# Patient Record
Sex: FEMALE | Race: WHITE
[De-identification: names, ages, dates, MRNs, and addresses within clinical notes are randomized per-mention and may not be internally consistent; named-entity substitution may affect disease eponyms.]

---

## 2018-05-08 ENCOUNTER — HOSPITAL ENCOUNTER (OUTPATIENT)
Age: 23
End: 2018-05-08
Payer: COMMERCIAL

## 2018-05-08 DIAGNOSIS — Z12.4: Primary | ICD-10-CM

## 2018-05-08 PROCEDURE — 88175 CYTOPATH C/V AUTO FLUID REDO: CPT

## 2018-05-08 PROCEDURE — G0145 SCR C/V CYTO,THINLAYER,RESCR: HCPCS

## 2019-06-25 ENCOUNTER — HOSPITAL ENCOUNTER (OUTPATIENT)
Age: 24
End: 2019-06-25
Payer: COMMERCIAL

## 2019-06-25 VITALS — BODY MASS INDEX: 32.8 KG/M2

## 2019-06-25 DIAGNOSIS — Z11.3: Primary | ICD-10-CM

## 2019-06-25 LAB — SAMPLE ADEQUACY CONTROL: (no result)

## 2019-06-25 PROCEDURE — 87491 CHLMYD TRACH DNA AMP PROBE: CPT

## 2019-06-25 PROCEDURE — 87591 N.GONORRHOEAE DNA AMP PROB: CPT

## 2021-12-22 ENCOUNTER — HOSPITAL ENCOUNTER (OUTPATIENT)
Age: 26
End: 2021-12-22
Payer: COMMERCIAL

## 2021-12-22 DIAGNOSIS — Z12.4: Primary | ICD-10-CM

## 2021-12-22 PROCEDURE — G0145 SCR C/V CYTO,THINLAYER,RESCR: HCPCS

## 2021-12-22 PROCEDURE — 88175 CYTOPATH C/V AUTO FLUID REDO: CPT

## 2023-02-01 ENCOUNTER — HOSPITAL ENCOUNTER (OUTPATIENT)
Age: 28
Discharge: HOME | End: 2023-02-01
Payer: COMMERCIAL

## 2023-02-01 DIAGNOSIS — Z34.90: Primary | ICD-10-CM

## 2023-02-01 PROCEDURE — 87591 N.GONORRHOEAE DNA AMP PROB: CPT

## 2023-02-01 PROCEDURE — 87491 CHLMYD TRACH DNA AMP PROBE: CPT

## 2023-02-01 PROCEDURE — 87086 URINE CULTURE/COLONY COUNT: CPT

## 2023-03-02 ENCOUNTER — HOSPITAL ENCOUNTER (OUTPATIENT)
Age: 28
Discharge: HOME | End: 2023-03-02
Payer: COMMERCIAL

## 2023-03-02 DIAGNOSIS — O99.210: Primary | ICD-10-CM

## 2023-03-02 LAB
DEPRECATED RDW RBC: 38.9 FL (ref 35.1–43.9)
ERYTHROCYTE [DISTWIDTH] IN BLOOD: 13.1 % (ref 11.6–14.6)
GLUCOSE 1H P 50 G GLC PO SERPL-MCNC: 104 MG/DL (ref 70–140)
HCT VFR BLD AUTO: 37.2 % (ref 37–47)
HEMOGLOBIN: 12.2 G/DL (ref 12–15)
HGB BLD-MCNC: 12.2 G/DL (ref 12–15)
IMMATURE GRANULOCYTES COUNT: 0.14 X10^3/UL (ref 0–0)
MCV RBC: 82.1 FL (ref 81–99)
MEAN CORP HGB CONC: 32.8 G/DL (ref 32–36)
MEAN PLATELET VOL.: 10.2 FL (ref 6.2–12)
NRBC FLAGGED BY ANALYZER: 0 % (ref 0–5)
PLATELET # BLD: 287 K/MM3 (ref 150–450)
PLATELET COUNT: 287 K/MM3 (ref 150–450)
RBC # BLD AUTO: 4.53 M/MM3 (ref 4.2–5.4)
RBC DISTRIBUTION WIDTH CV: 13.1 % (ref 11.6–14.6)
RBC DISTRIBUTION WIDTH SD: 38.9 FL (ref 35.1–43.9)
WBC # BLD AUTO: 9.4 K/MM3 (ref 4.4–11)
WHITE BLOOD COUNT: 9.4 K/MM3 (ref 4.4–11)

## 2023-03-02 PROCEDURE — 86803 HEPATITIS C AB TEST: CPT

## 2023-03-02 PROCEDURE — 85025 COMPLETE CBC W/AUTO DIFF WBC: CPT

## 2023-03-02 PROCEDURE — 86780 TREPONEMA PALLIDUM: CPT

## 2023-03-02 PROCEDURE — 36415 COLL VENOUS BLD VENIPUNCTURE: CPT

## 2023-03-02 PROCEDURE — 86850 RBC ANTIBODY SCREEN: CPT

## 2023-03-02 PROCEDURE — 86900 BLOOD TYPING SEROLOGIC ABO: CPT

## 2023-03-02 PROCEDURE — 82950 GLUCOSE TEST: CPT

## 2023-03-02 PROCEDURE — 86762 RUBELLA ANTIBODY: CPT

## 2023-03-02 PROCEDURE — 86703 HIV-1/HIV-2 1 RESULT ANTBDY: CPT

## 2023-03-02 PROCEDURE — 87340 HEPATITIS B SURFACE AG IA: CPT

## 2023-03-02 PROCEDURE — 86901 BLOOD TYPING SEROLOGIC RH(D): CPT

## 2023-04-06 ENCOUNTER — HOSPITAL ENCOUNTER (OUTPATIENT)
Age: 28
Discharge: HOME | End: 2023-04-06
Payer: COMMERCIAL

## 2023-04-06 DIAGNOSIS — Z34.92: Primary | ICD-10-CM

## 2023-04-06 PROCEDURE — 76805 OB US >/= 14 WKS SNGL FETUS: CPT

## 2023-04-06 PROCEDURE — 76817 TRANSVAGINAL US OBSTETRIC: CPT

## 2023-06-08 ENCOUNTER — HOSPITAL ENCOUNTER (OUTPATIENT)
Dept: HOSPITAL 100 - LAB | Age: 28
Discharge: HOME | End: 2023-06-08
Payer: COMMERCIAL

## 2023-06-08 DIAGNOSIS — Z3A.00: ICD-10-CM

## 2023-06-08 DIAGNOSIS — O09.90: Primary | ICD-10-CM

## 2023-06-08 LAB
DEPRECATED RDW RBC: 44 FL (ref 35.1–43.9)
ERYTHROCYTE [DISTWIDTH] IN BLOOD: 13.9 % (ref 11.6–14.6)
GLUCOSE 1H P 50 G GLC PO SERPL-MCNC: 116 MG/DL (ref 70–140)
HCT VFR BLD AUTO: 37.7 % (ref 37–47)
HEMOGLOBIN: 11.8 G/DL (ref 12–15)
HGB BLD-MCNC: 11.8 G/DL (ref 12–15)
IMMATURE GRANULOCYTES COUNT: 0.09 X10^3/UL (ref 0–0)
MCV RBC: 87.7 FL (ref 81–99)
MEAN CORP HGB CONC: 31.3 G/DL (ref 32–36)
MEAN PLATELET VOL.: 10.4 FL (ref 6.2–12)
NRBC FLAGGED BY ANALYZER: 0 % (ref 0–5)
PLATELET # BLD: 303 K/MM3 (ref 150–450)
PLATELET COUNT: 303 K/MM3 (ref 150–450)
RBC # BLD AUTO: 4.3 M/MM3 (ref 4.2–5.4)
RBC DISTRIBUTION WIDTH CV: 13.9 % (ref 11.6–14.6)
RBC DISTRIBUTION WIDTH SD: 44 FL (ref 35.1–43.9)
WBC # BLD AUTO: 10.2 K/MM3 (ref 4.4–11)
WHITE BLOOD COUNT: 10.2 K/MM3 (ref 4.4–11)

## 2023-06-08 PROCEDURE — 85025 COMPLETE CBC W/AUTO DIFF WBC: CPT

## 2023-06-08 PROCEDURE — 82950 GLUCOSE TEST: CPT

## 2023-06-08 PROCEDURE — 36415 COLL VENOUS BLD VENIPUNCTURE: CPT

## 2023-06-08 PROCEDURE — 86780 TREPONEMA PALLIDUM: CPT

## 2023-06-08 PROCEDURE — 86901 BLOOD TYPING SEROLOGIC RH(D): CPT

## 2023-06-08 PROCEDURE — 86900 BLOOD TYPING SEROLOGIC ABO: CPT

## 2023-06-08 PROCEDURE — 86703 HIV-1/HIV-2 1 RESULT ANTBDY: CPT

## 2023-08-04 ENCOUNTER — HOSPITAL ENCOUNTER (OUTPATIENT)
Dept: HOSPITAL 100 - LABSPEC | Age: 28
Discharge: HOME | End: 2023-08-04
Payer: COMMERCIAL

## 2023-08-04 DIAGNOSIS — Z3A.00: ICD-10-CM

## 2023-08-04 DIAGNOSIS — O09.90: Primary | ICD-10-CM

## 2023-08-04 PROCEDURE — 87081 CULTURE SCREEN ONLY: CPT

## 2023-08-31 ENCOUNTER — HOSPITAL ENCOUNTER (INPATIENT)
Age: 28
LOS: 2 days | Discharge: HOME | End: 2023-09-02
Payer: COMMERCIAL

## 2023-08-31 VITALS — DIASTOLIC BLOOD PRESSURE: 59 MMHG | SYSTOLIC BLOOD PRESSURE: 115 MMHG

## 2023-08-31 VITALS — OXYGEN SATURATION: 100 % | HEART RATE: 139 BPM

## 2023-08-31 VITALS — HEART RATE: 116 BPM | OXYGEN SATURATION: 95 %

## 2023-08-31 VITALS — TEMPERATURE: 97.9 F | DIASTOLIC BLOOD PRESSURE: 64 MMHG | HEART RATE: 108 BPM | SYSTOLIC BLOOD PRESSURE: 136 MMHG

## 2023-08-31 VITALS — OXYGEN SATURATION: 100 % | TEMPERATURE: 97.2 F | HEART RATE: 95 BPM

## 2023-08-31 VITALS — HEART RATE: 81 BPM | SYSTOLIC BLOOD PRESSURE: 139 MMHG | DIASTOLIC BLOOD PRESSURE: 78 MMHG

## 2023-08-31 VITALS — OXYGEN SATURATION: 99 % | HEART RATE: 90 BPM

## 2023-08-31 VITALS — TEMPERATURE: 97.52 F | HEART RATE: 101 BPM | OXYGEN SATURATION: 98 %

## 2023-08-31 VITALS
HEART RATE: 112 BPM | DIASTOLIC BLOOD PRESSURE: 63 MMHG | TEMPERATURE: 97.2 F | SYSTOLIC BLOOD PRESSURE: 128 MMHG | OXYGEN SATURATION: 98 %

## 2023-08-31 VITALS — OXYGEN SATURATION: 100 % | SYSTOLIC BLOOD PRESSURE: 115 MMHG | DIASTOLIC BLOOD PRESSURE: 53 MMHG | HEART RATE: 97 BPM

## 2023-08-31 VITALS — HEART RATE: 108 BPM | DIASTOLIC BLOOD PRESSURE: 76 MMHG | SYSTOLIC BLOOD PRESSURE: 129 MMHG

## 2023-08-31 VITALS — HEART RATE: 143 BPM | OXYGEN SATURATION: 100 %

## 2023-08-31 VITALS — HEART RATE: 100 BPM | SYSTOLIC BLOOD PRESSURE: 137 MMHG | DIASTOLIC BLOOD PRESSURE: 67 MMHG

## 2023-08-31 VITALS — OXYGEN SATURATION: 99 % | HEART RATE: 100 BPM

## 2023-08-31 VITALS — TEMPERATURE: 97.52 F

## 2023-08-31 VITALS — OXYGEN SATURATION: 99 % | HEART RATE: 132 BPM

## 2023-08-31 VITALS
DIASTOLIC BLOOD PRESSURE: 73 MMHG | OXYGEN SATURATION: 97 % | HEART RATE: 99 BPM | SYSTOLIC BLOOD PRESSURE: 132 MMHG | TEMPERATURE: 97.52 F

## 2023-08-31 VITALS — DIASTOLIC BLOOD PRESSURE: 83 MMHG | TEMPERATURE: 97.6 F | HEART RATE: 94 BPM | SYSTOLIC BLOOD PRESSURE: 121 MMHG

## 2023-08-31 VITALS — BODY MASS INDEX: 42.9 KG/M2

## 2023-08-31 VITALS — OXYGEN SATURATION: 86 % | HEART RATE: 117 BPM

## 2023-08-31 VITALS — HEART RATE: 90 BPM | SYSTOLIC BLOOD PRESSURE: 132 MMHG | DIASTOLIC BLOOD PRESSURE: 87 MMHG

## 2023-08-31 VITALS — HEART RATE: 98 BPM | OXYGEN SATURATION: 98 %

## 2023-08-31 VITALS — SYSTOLIC BLOOD PRESSURE: 112 MMHG | DIASTOLIC BLOOD PRESSURE: 76 MMHG | HEART RATE: 88 BPM

## 2023-08-31 VITALS — OXYGEN SATURATION: 99 % | HEART RATE: 115 BPM

## 2023-08-31 VITALS — HEART RATE: 102 BPM | SYSTOLIC BLOOD PRESSURE: 97 MMHG | DIASTOLIC BLOOD PRESSURE: 55 MMHG | OXYGEN SATURATION: 100 %

## 2023-08-31 VITALS — HEART RATE: 117 BPM | OXYGEN SATURATION: 100 %

## 2023-08-31 VITALS — SYSTOLIC BLOOD PRESSURE: 104 MMHG | DIASTOLIC BLOOD PRESSURE: 56 MMHG

## 2023-08-31 VITALS — OXYGEN SATURATION: 89 % | HEART RATE: 126 BPM

## 2023-08-31 VITALS — TEMPERATURE: 97.52 F | OXYGEN SATURATION: 99 %

## 2023-08-31 VITALS
SYSTOLIC BLOOD PRESSURE: 125 MMHG | TEMPERATURE: 97.7 F | HEART RATE: 89 BPM | DIASTOLIC BLOOD PRESSURE: 58 MMHG | OXYGEN SATURATION: 94 %

## 2023-08-31 VITALS — TEMPERATURE: 97.6 F | OXYGEN SATURATION: 98 %

## 2023-08-31 VITALS — HEART RATE: 103 BPM | OXYGEN SATURATION: 98 %

## 2023-08-31 VITALS — HEART RATE: 102 BPM | OXYGEN SATURATION: 99 %

## 2023-08-31 VITALS — SYSTOLIC BLOOD PRESSURE: 127 MMHG | OXYGEN SATURATION: 94 % | DIASTOLIC BLOOD PRESSURE: 58 MMHG | HEART RATE: 88 BPM

## 2023-08-31 VITALS — HEART RATE: 104 BPM | OXYGEN SATURATION: 98 %

## 2023-08-31 VITALS — HEART RATE: 100 BPM | OXYGEN SATURATION: 98 %

## 2023-08-31 VITALS — DIASTOLIC BLOOD PRESSURE: 59 MMHG | SYSTOLIC BLOOD PRESSURE: 113 MMHG | HEART RATE: 112 BPM

## 2023-08-31 VITALS — HEART RATE: 130 BPM | OXYGEN SATURATION: 100 %

## 2023-08-31 VITALS — DIASTOLIC BLOOD PRESSURE: 67 MMHG | SYSTOLIC BLOOD PRESSURE: 141 MMHG | HEART RATE: 101 BPM

## 2023-08-31 VITALS — DIASTOLIC BLOOD PRESSURE: 57 MMHG | SYSTOLIC BLOOD PRESSURE: 105 MMHG

## 2023-08-31 VITALS — OXYGEN SATURATION: 100 % | HEART RATE: 113 BPM

## 2023-08-31 VITALS — HEART RATE: 140 BPM | OXYGEN SATURATION: 99 %

## 2023-08-31 VITALS — HEART RATE: 121 BPM | OXYGEN SATURATION: 99 %

## 2023-08-31 VITALS — OXYGEN SATURATION: 99 % | HEART RATE: 98 BPM

## 2023-08-31 VITALS — OXYGEN SATURATION: 100 % | HEART RATE: 104 BPM

## 2023-08-31 VITALS — OXYGEN SATURATION: 98 % | HEART RATE: 96 BPM

## 2023-08-31 VITALS — DIASTOLIC BLOOD PRESSURE: 59 MMHG | SYSTOLIC BLOOD PRESSURE: 112 MMHG

## 2023-08-31 VITALS — OXYGEN SATURATION: 98 % | HEART RATE: 86 BPM

## 2023-08-31 VITALS — OXYGEN SATURATION: 100 % | HEART RATE: 93 BPM

## 2023-08-31 VITALS — OXYGEN SATURATION: 100 % | HEART RATE: 94 BPM

## 2023-08-31 VITALS — TEMPERATURE: 96.98 F

## 2023-08-31 VITALS — TEMPERATURE: 97.34 F

## 2023-08-31 VITALS — OXYGEN SATURATION: 100 % | HEART RATE: 100 BPM

## 2023-08-31 VITALS — SYSTOLIC BLOOD PRESSURE: 100 MMHG | DIASTOLIC BLOOD PRESSURE: 52 MMHG

## 2023-08-31 VITALS — OXYGEN SATURATION: 93 % | HEART RATE: 98 BPM

## 2023-08-31 VITALS — HEART RATE: 48 BPM | OXYGEN SATURATION: 80 %

## 2023-08-31 VITALS — OXYGEN SATURATION: 99 % | HEART RATE: 99 BPM

## 2023-08-31 VITALS — OXYGEN SATURATION: 100 %

## 2023-08-31 VITALS — HEART RATE: 103 BPM | OXYGEN SATURATION: 100 %

## 2023-08-31 VITALS — HEART RATE: 131 BPM | OXYGEN SATURATION: 91 %

## 2023-08-31 DIAGNOSIS — O26.893: ICD-10-CM

## 2023-08-31 DIAGNOSIS — Z3A.39: ICD-10-CM

## 2023-08-31 DIAGNOSIS — Z67.11: ICD-10-CM

## 2023-08-31 LAB
ALANINE AMINOTRANSFER ALT/SGPT: 16 U/L (ref 13–56)
AST(SGOT): 20 U/L (ref 15–37)
CREAT UR-MCNC: 291 MG/DL
DEPRECATED RDW RBC: 42.8 FL (ref 35.1–43.9)
ERYTHROCYTE [DISTWIDTH] IN BLOOD: 14.1 % (ref 11.6–14.6)
EST GLOM FILT RATE - AFR AMER: 160 ML/MIN (ref 60–?)
ESTIMATED CREATININE CLEARANCE: 129.94 ML/MIN
HCT VFR BLD AUTO: 39.4 % (ref 37–47)
HEMOGLOBIN: 12.8 G/DL (ref 12–15)
HGB BLD-MCNC: 12.8 G/DL (ref 12–15)
IMMATURE GRANULOCYTES COUNT: 0.08 X10^3/UL (ref 0–0)
MCV RBC: 83.3 FL (ref 81–99)
MEAN CORP HGB CONC: 32.5 G/DL (ref 32–36)
MEAN PLATELET VOL.: 11.7 FL (ref 6.2–12)
NRBC FLAGGED BY ANALYZER: 0 % (ref 0–5)
PLATELET # BLD: 244 K/MM3 (ref 150–450)
PLATELET COUNT: 244 K/MM3 (ref 150–450)
PROT UR-MCNC: 64.5 MG/DL (ref ?–11.9)
PROT/CREAT UR: 222 MG/G CRE (ref 0–200)
RBC # BLD AUTO: 4.73 M/MM3 (ref 4.2–5.4)
RBC DISTRIBUTION WIDTH CV: 14.1 % (ref 11.6–14.6)
RBC DISTRIBUTION WIDTH SD: 42.8 FL (ref 35.1–43.9)
URATE SERPL-MCNC: 6.1 MG/DL (ref 2.6–6)
WBC # BLD AUTO: 12.5 K/MM3 (ref 4.4–11)
WHITE BLOOD COUNT: 12.5 K/MM3 (ref 4.4–11)

## 2023-08-31 PROCEDURE — 82570 ASSAY OF URINE CREATININE: CPT

## 2023-08-31 PROCEDURE — 59025 FETAL NON-STRESS TEST: CPT

## 2023-08-31 PROCEDURE — 86901 BLOOD TYPING SEROLOGIC RH(D): CPT

## 2023-08-31 PROCEDURE — 76815 OB US LIMITED FETUS(S): CPT

## 2023-08-31 PROCEDURE — 86780 TREPONEMA PALLIDUM: CPT

## 2023-08-31 PROCEDURE — 99221 1ST HOSP IP/OBS SF/LOW 40: CPT

## 2023-08-31 PROCEDURE — 85461 HEMOGLOBIN FETAL: CPT

## 2023-08-31 PROCEDURE — 86850 RBC ANTIBODY SCREEN: CPT

## 2023-08-31 PROCEDURE — 85025 COMPLETE CBC W/AUTO DIFF WBC: CPT

## 2023-08-31 PROCEDURE — 82565 ASSAY OF CREATININE: CPT

## 2023-08-31 PROCEDURE — 84450 TRANSFERASE (AST) (SGOT): CPT

## 2023-08-31 PROCEDURE — 84550 ASSAY OF BLOOD/URIC ACID: CPT

## 2023-08-31 PROCEDURE — 86900 BLOOD TYPING SEROLOGIC ABO: CPT

## 2023-08-31 PROCEDURE — G0378 HOSPITAL OBSERVATION PER HR: HCPCS

## 2023-08-31 PROCEDURE — 84156 ASSAY OF PROTEIN URINE: CPT

## 2023-08-31 PROCEDURE — 84460 ALANINE AMINO (ALT) (SGPT): CPT

## 2023-08-31 PROCEDURE — A4216 STERILE WATER/SALINE, 10 ML: HCPCS

## 2023-08-31 PROCEDURE — 59050 FETAL MONITOR W/REPORT: CPT

## 2023-09-01 VITALS
HEART RATE: 76 BPM | OXYGEN SATURATION: 97 % | RESPIRATION RATE: 16 BRPM | DIASTOLIC BLOOD PRESSURE: 67 MMHG | SYSTOLIC BLOOD PRESSURE: 108 MMHG | TEMPERATURE: 97.52 F

## 2023-09-01 VITALS
HEART RATE: 90 BPM | DIASTOLIC BLOOD PRESSURE: 47 MMHG | OXYGEN SATURATION: 97 % | SYSTOLIC BLOOD PRESSURE: 96 MMHG | TEMPERATURE: 98.1 F | RESPIRATION RATE: 16 BRPM

## 2023-09-01 VITALS
TEMPERATURE: 97.52 F | SYSTOLIC BLOOD PRESSURE: 119 MMHG | RESPIRATION RATE: 16 BRPM | DIASTOLIC BLOOD PRESSURE: 57 MMHG | HEART RATE: 96 BPM

## 2023-09-01 VITALS
OXYGEN SATURATION: 97 % | DIASTOLIC BLOOD PRESSURE: 65 MMHG | RESPIRATION RATE: 16 BRPM | TEMPERATURE: 97.9 F | SYSTOLIC BLOOD PRESSURE: 121 MMHG | HEART RATE: 94 BPM

## 2023-09-01 VITALS
RESPIRATION RATE: 14 BRPM | HEART RATE: 75 BPM | DIASTOLIC BLOOD PRESSURE: 60 MMHG | SYSTOLIC BLOOD PRESSURE: 113 MMHG | OXYGEN SATURATION: 97 % | TEMPERATURE: 97.9 F

## 2023-09-02 VITALS
SYSTOLIC BLOOD PRESSURE: 110 MMHG | RESPIRATION RATE: 16 BRPM | DIASTOLIC BLOOD PRESSURE: 59 MMHG | TEMPERATURE: 97.8 F | HEART RATE: 83 BPM

## 2023-09-02 VITALS
SYSTOLIC BLOOD PRESSURE: 111 MMHG | DIASTOLIC BLOOD PRESSURE: 44 MMHG | OXYGEN SATURATION: 97 % | HEART RATE: 78 BPM | TEMPERATURE: 96.98 F | RESPIRATION RATE: 16 BRPM

## 2024-06-18 ENCOUNTER — APPOINTMENT (OUTPATIENT)
Dept: URBAN - METROPOLITAN AREA CLINIC 204 | Age: 29
Setting detail: DERMATOLOGY
End: 2024-06-19

## 2024-06-18 PROCEDURE — 99213 OFFICE O/P EST LOW 20 MIN: CPT

## 2024-06-18 PROCEDURE — OTHER MIPS QUALITY: OTHER

## 2024-10-25 ENCOUNTER — HOSPITAL ENCOUNTER (OUTPATIENT)
Dept: HOSPITAL 100 - LABSPEC | Age: 29
Discharge: HOME | End: 2024-10-25
Payer: COMMERCIAL

## 2024-10-25 DIAGNOSIS — Z12.4: Primary | ICD-10-CM

## 2024-10-25 PROCEDURE — G0145 SCR C/V CYTO,THINLAYER,RESCR: HCPCS

## 2024-10-25 PROCEDURE — 88175 CYTOPATH C/V AUTO FLUID REDO: CPT

## 2024-12-27 ENCOUNTER — HOSPITAL ENCOUNTER (OUTPATIENT)
Dept: HOSPITAL 100 - US | Age: 29
Discharge: HOME | End: 2024-12-27
Payer: COMMERCIAL

## 2024-12-27 ENCOUNTER — HOSPITAL ENCOUNTER (OUTPATIENT)
Dept: HOSPITAL 100 - BWCLAB | Age: 29
Discharge: HOME | End: 2024-12-27
Payer: COMMERCIAL

## 2024-12-27 DIAGNOSIS — Z3A.00: ICD-10-CM

## 2024-12-27 DIAGNOSIS — O36.80X0: Primary | ICD-10-CM

## 2024-12-27 LAB — HCG SERPL QL: 3695 MIU/ML (ref 1–3)

## 2024-12-27 PROCEDURE — 86900 BLOOD TYPING SEROLOGIC ABO: CPT

## 2024-12-27 PROCEDURE — 76817 TRANSVAGINAL US OBSTETRIC: CPT

## 2024-12-27 PROCEDURE — 86850 RBC ANTIBODY SCREEN: CPT

## 2024-12-27 PROCEDURE — 36415 COLL VENOUS BLD VENIPUNCTURE: CPT

## 2024-12-27 PROCEDURE — 86901 BLOOD TYPING SEROLOGIC RH(D): CPT

## 2024-12-27 PROCEDURE — 84702 CHORIONIC GONADOTROPIN TEST: CPT

## 2024-12-27 NOTE — XMS RPT_ITS
Comprehensive CCD (C-CDA v2.1)  
  
                          Created on: 2024  
  
  
THIEN CAMACHO  
External Reference #: CDR,PersonID:[id number]  
: 1995  
Sex: Female  
  
Demographics  
  
  
                                        Address             [address]  
[address]  
[place], [place]  [id number]  
   
                                        Home Phone          [phone]  
   
                                        Home Phone          [phone]  
   
                                        Preferred Language  English  
   
                                        Marital Status        
   
                                        Shinto Affiliation Unknown  
   
                                        Race                White  
   
                                        Ethnic Group        Unknown  
  
  
Author  
  
  
                                        Organization        [facility]  
  
  
Care Team Providers  
  
  
                                Care Team Member Name Role            Phone  
   
                                Jones Matos Unavailable     Unavailable  
   
                                Jones Matos Unavailable     Unavailable  
   
                                Furness, Keith T Unavailable     Unavailable  
  
  
  
Allergies  
  
  
                                                    Allergy   
Classification                          Reported   
Allergen(s)               Allergy Type              Date of   
Onset                     Reaction(s)               Facility  
   
                                                      
(1 source)                              No Known   
Allergies;   
Translations:   
[No Known   
Allergies]                              Propensity to   
adverse   
reactions to   
drug (disorder)                                             [facility]   
Repository  
   
                                                      
(1 source)                              No Known   
Medication   
Allergies;   
Translations:   
[No Known   
Medication   
Allergies]                              Propensity to   
adverse   
reactions to   
drug (disorder)                                             [facility]   
Repository  
  
  
  
Encounters  
  
  
                          Encounter Date Encounter Type Care Provider Facility  
   
                                                    Start: 2017  
End: 2017     Ambulatory          Jones Matos   Facility:[facility]  
ospital  
  
  
  
Payers  
  
  
                          Date         Payer Category Payer        Policy ID  
   
                          2017   [employer] ( and others  
)                
  
  
  
Summary Purpose  
  
  
                                                      
  
  
  
Family History  
No Family History Records Found  
  
Advance Directives  
No Advanced Directives Records Found  
  
Additional Source Comments  
  
  
  
                                                    INFORMATION SOURCE (unrecogn  
ized section and content)  
   
                                          
  
  
  
                                        DATE CREATED        AUTHOR  
   
                                2018                      [facility]  
  
  
FOR RECORDS PERTAINING TO PATIENTS WHO ARE OR HAVE BEEN ENROLLED IN A CHEMICAL 
DEPENDENCY/SUBSTANCEABUSE PROGRAM, SOME INFORMATION MAY BE OMITTED. This 
clinical summary was aggregated from multiple sources. Caution should be 
exercised in using it in the provision of clinical care. This summary normalizes
information from multiple sources, and as a consequence, information in this 
document may materially change the coding, format and clinical context of 
patient data. In addition, data may be omitted in some cases. CLINICAL DECISIONS
SHOULD BE BASED ON THE PRIMARY CLINICAL RECORDS. [employer] provides 
no warranty or guarantee of the accuracy or completeness of information in this 
document.

## 2024-12-27 NOTE — XMS RPT_ITS
Comprehensive CCD (C-CDA v2.1)  
  
                          Created on: 2024  
  
  
THIEN CAMACHO  
External Reference #: CDR,PersonID:[id number]  
: 1995  
Sex: Female  
  
Demographics  
  
  
                                        Address             [address]  
[address]  
[place], [place]  [id number]  
   
                                        Home Phone          [phone]  
   
                                        Home Phone          [phone]  
   
                                        Preferred Language  English  
   
                                        Marital Status        
   
                                        Scientologist Affiliation Unknown  
   
                                        Race                White  
   
                                        Ethnic Group        Unknown  
  
  
Author  
  
  
                                        Organization        [facility]  
  
  
Care Team Providers  
  
  
                                Care Team Member Name Role            Phone  
   
                                Jones Matos Unavailable     Unavailable  
   
                                Jones Matos Unavailable     Unavailable  
   
                                Furness, Keith T Unavailable     Unavailable  
  
  
  
Allergies  
  
  
                                                    Allergy   
Classification                          Reported   
Allergen(s)               Allergy Type              Date of   
Onset                     Reaction(s)               Facility  
   
                                                      
(1 source)                              No Known   
Allergies;   
Translations:   
[No Known   
Allergies]                              Propensity to   
adverse   
reactions to   
drug (disorder)                                             [facility]   
Repository  
   
                                                      
(1 source)                              No Known   
Medication   
Allergies;   
Translations:   
[No Known   
Medication   
Allergies]                              Propensity to   
adverse   
reactions to   
drug (disorder)                                             [facility]   
Repository  
  
  
  
Encounters  
  
  
                          Encounter Date Encounter Type Care Provider Facility  
   
                                                    Start: 2017  
End: 2017     Ambulatory          Jones Matos   Facility:[facility]  
ospital  
  
  
  
Payers  
  
  
                          Date         Payer Category Payer        Policy ID  
   
                          2017   [employer] ( and others  
)                
  
  
  
Summary Purpose  
  
  
                                                      
  
  
  
Family History  
No Family History Records Found  
  
Advance Directives  
No Advanced Directives Records Found  
  
Additional Source Comments  
  
  
  
                                                    INFORMATION SOURCE (unrecogn  
ized section and content)  
   
                                          
  
  
  
                                        DATE CREATED        AUTHOR  
   
                                2018                      [facility]  
  
  
FOR RECORDS PERTAINING TO PATIENTS WHO ARE OR HAVE BEEN ENROLLED IN A CHEMICAL 
DEPENDENCY/SUBSTANCEABUSE PROGRAM, SOME INFORMATION MAY BE OMITTED. This 
clinical summary was aggregated from multiple sources. Caution should be 
exercised in using it in the provision of clinical care. This summary normalizes
information from multiple sources, and as a consequence, information in this 
document may materially change the coding, format and clinical context of 
patient data. In addition, data may be omitted in some cases. CLINICAL DECISIONS
SHOULD BE BASED ON THE PRIMARY CLINICAL RECORDS. [employer] provides 
no warranty or guarantee of the accuracy or completeness of information in this 
document.

## 2024-12-27 NOTE — US_ITS
REPORT-ID:[id number] 
 
INDICATION: fetal well being 
 
EXAMINATION: US OB Transvaginal 
 
TECHNIQUE: Transvaginal (for optimal evaluation of the adnexa) pelvic 
ultrasound was performed. Grayscale, spectral waveform, and color flow 
Doppler evaluation of the adnexa. 
 
COMPARISON: None. 
____________________________________________ 
 
FINDINGS: 
UTERUS: Measures 10.3 cm in length.. 
RIGHT OVARY:: Not visualized. 
LEFT OVARY: Not visualized. 
FREE FLUID:  None. 
 
INTRAUTERINE GESTATIONAL SAC(s) (size/shape): Single. Mean sac diameter of 
1.1 mm. 
 
No adnexal ectopic visualized. 
 
ORDER #: [id number] US/Transvaginal w/Preg US  
IMPRESSION:  
 
  
Intrauterine pregnancy of uncertain viability. There is an intrauterine  
gestational sac with no yolk sac or embryo. Recommend serial beta hCGs and  
follow-up OB ultrasound in 1-2 weeks.  
 
  
Electronically Signed:  
Romeo Gonzales MD  
2024/12/27 at 16:53 EST  
Reading Location ID and State: [id number] / [place]  
Tel [phone], Service support  [phone], Fax [phone]

## 2024-12-29 ENCOUNTER — HOSPITAL ENCOUNTER (OUTPATIENT)
Dept: HOSPITAL 100 - LAB | Age: 29
Discharge: HOME | End: 2024-12-29
Payer: COMMERCIAL

## 2024-12-29 DIAGNOSIS — O20.9: Primary | ICD-10-CM

## 2024-12-29 DIAGNOSIS — Z3A.00: ICD-10-CM

## 2024-12-29 LAB — HCG SERPL QL: 3635 MIU/ML (ref 1–3)

## 2024-12-29 PROCEDURE — 36415 COLL VENOUS BLD VENIPUNCTURE: CPT

## 2024-12-29 PROCEDURE — 84702 CHORIONIC GONADOTROPIN TEST: CPT

## 2024-12-29 NOTE — XMS RPT_ITS
Comprehensive CCD (C-CDA v2.1)  
  
                          Created on: 2024  
  
  
THIEN CAMACHO  
External Reference #: CDR,PersonID:[id number]  
: 1995  
Sex: Female  
  
Demographics  
  
  
                                        Address             [address]  
[address]  
[place], [place]  [id number]  
   
                                        Home Phone          [phone]  
   
                                        Home Phone          [phone]  
   
                                        Preferred Language  English  
   
                                        Marital Status        
   
                                        Mu-ism Affiliation Unknown  
   
                                        Race                White  
   
                                        Ethnic Group        Unknown  
  
  
Author  
  
  
                                        Organization        [facility]  
  
  
Care Team Providers  
  
  
                                Care Team Member Name Role            Phone  
   
                                Jnoes Matos Unavailable     Unavailable  
   
                                Jones Matos Unavailable     Unavailable  
   
                                Furness, Keith T Unavailable     Unavailable  
  
  
  
Allergies  
  
  
                                                    Allergy   
Classification                          Reported   
Allergen(s)               Allergy Type              Date of   
Onset                     Reaction(s)               Facility  
   
                                                      
(1 source)                              No Known   
Allergies;   
Translations:   
[No Known   
Allergies]                              Propensity to   
adverse   
reactions to   
drug (disorder)                                             [facility]   
Repository  
   
                                                      
(1 source)                              No Known   
Medication   
Allergies;   
Translations:   
[No Known   
Medication   
Allergies]                              Propensity to   
adverse   
reactions to   
drug (disorder)                                             [facility]   
Repository  
  
  
  
Encounters  
  
  
                          Encounter Date Encounter Type Care Provider Facility  
   
                                                    Start: 2017  
End: 2017     Ambulatory          Jones Matos   Facility:[facility]  
ospital  
  
  
  
Payers  
  
  
                          Date         Payer Category Payer        Policy ID  
   
                          2017   [employer] ( and others  
)                
  
  
  
Summary Purpose  
  
  
                                                      
  
  
  
Family History  
No Family History Records Found  
  
Advance Directives  
No Advanced Directives Records Found  
  
Additional Source Comments  
  
  
  
                                                    INFORMATION SOURCE (unrecogn  
ized section and content)  
   
                                          
  
  
  
                                        DATE CREATED        AUTHOR  
   
                                2018                      [facility]  
  
  
FOR RECORDS PERTAINING TO PATIENTS WHO ARE OR HAVE BEEN ENROLLED IN A CHEMICAL 
DEPENDENCY/SUBSTANCEABUSE PROGRAM, SOME INFORMATION MAY BE OMITTED. This 
clinical summary was aggregated from multiple sources. Caution should be 
exercised in using it in the provision of clinical care. This summary normalizes
information from multiple sources, and as a consequence, information in this 
document may materially change the coding, format and clinical context of 
patient data. In addition, data may be omitted in some cases. CLINICAL DECISIONS
SHOULD BE BASED ON THE PRIMARY CLINICAL RECORDS. [employer] provides 
no warranty or guarantee of the accuracy or completeness of information in this 
document.

## 2025-01-03 ENCOUNTER — HOSPITAL ENCOUNTER (OUTPATIENT)
Dept: HOSPITAL 100 - BWCLAB | Age: 30
Discharge: HOME | End: 2025-01-03
Payer: COMMERCIAL

## 2025-01-03 DIAGNOSIS — O20.9: Primary | ICD-10-CM

## 2025-01-03 DIAGNOSIS — Z3A.00: ICD-10-CM

## 2025-01-03 LAB — HCG SERPL QL: 879 MIU/ML (ref 1–3)

## 2025-01-03 PROCEDURE — 36415 COLL VENOUS BLD VENIPUNCTURE: CPT

## 2025-01-03 PROCEDURE — 84702 CHORIONIC GONADOTROPIN TEST: CPT

## 2025-02-13 ENCOUNTER — HOSPITAL ENCOUNTER (OUTPATIENT)
Dept: HOSPITAL 100 - BWCLAB | Age: 30
Discharge: HOME | End: 2025-02-13
Payer: COMMERCIAL

## 2025-02-13 DIAGNOSIS — N91.2: Primary | ICD-10-CM

## 2025-02-13 DIAGNOSIS — Z78.9: ICD-10-CM

## 2025-02-13 LAB — HCG SERPL QL: 1870 MIU/ML (ref 1–3)

## 2025-02-13 PROCEDURE — 36415 COLL VENOUS BLD VENIPUNCTURE: CPT

## 2025-02-13 PROCEDURE — 84702 CHORIONIC GONADOTROPIN TEST: CPT

## 2025-02-15 ENCOUNTER — HOSPITAL ENCOUNTER (OUTPATIENT)
Dept: HOSPITAL 100 - MTLAB | Age: 30
Discharge: HOME | End: 2025-02-15
Payer: COMMERCIAL

## 2025-02-15 DIAGNOSIS — Z78.9: ICD-10-CM

## 2025-02-15 DIAGNOSIS — N91.2: Primary | ICD-10-CM

## 2025-02-15 LAB — HCG SERPL QL: 3946 MIU/ML (ref 1–3)

## 2025-02-15 PROCEDURE — 36415 COLL VENOUS BLD VENIPUNCTURE: CPT

## 2025-02-15 PROCEDURE — 84702 CHORIONIC GONADOTROPIN TEST: CPT

## 2025-02-15 NOTE — XMS RPT_ITS
Comprehensive CCD (C-CDA v2.1)  
  
                          Created on: February 15, 2025  
  
  
THIEN CAMACHO  
External Reference #: CDR,PersonID:[id number]  
: 1995  
Sex: Female  
  
Demographics  
  
  
                                        Address             [address]  
[address]  
[place], [place]  [id number]  
   
                                        Home Phone          [phone]  
   
                                        Home Phone          [phone]  
   
                                        Preferred Language  English  
   
                                        Marital Status        
   
                                        Gnosticist Affiliation Unknown  
   
                                        Race                White  
   
                                        Ethnic Group        Unknown  
  
  
Author  
  
  
                                        Organization        [facility]  
  
  
Care Team Providers  
  
  
                                Care Team Member Name Role            Phone  
   
                                Jones Matos Unavailable     Unavailable  
   
                                Jones Matos Unavailable     Unavailable  
   
                                Furness, Keith T Unavailable     Unavailable  
  
  
  
Allergies  
  
  
                                                    Allergy   
Classification                          Reported   
Allergen(s)               Allergy Type              Date of   
Onset                     Reaction(s)               Facility  
   
                                                      
(1 source)                              No Known   
Allergies;   
Translations:   
[No Known   
Allergies]                              Propensity to   
adverse   
reactions to   
drug (disorder)                                             [facility]   
Repository  
   
                                                      
(1 source)                              No Known   
Medication   
Allergies;   
Translations:   
[No Known   
Medication   
Allergies]                              Propensity to   
adverse   
reactions to   
drug (disorder)                                             [facility]   
Repository  
  
  
  
Encounters  
  
  
                          Encounter Date Encounter Type Care Provider Facility  
   
                                                    Start: 2017  
End: 2017     Ambulatory          Jones Matos   Facility:[facility]  
ospital  
  
  
  
Payers  
  
  
                          Date         Payer Category Payer        Policy ID  
   
                          2017   [employer] ( and others  
)                
  
  
  
Summary Purpose  
  
  
                                                      
  
  
  
Family History  
No Family History Records Found  
  
Advance Directives  
No Advanced Directives Records Found  
  
Additional Source Comments  
  
  
  
                                                    INFORMATION SOURCE (unrecogn  
ized section and content)  
   
                                          
  
  
  
                                        DATE CREATED        AUTHOR  
   
                                2018                      [facility]  
  
  
FOR RECORDS PERTAINING TO PATIENTS WHO ARE OR HAVE BEEN ENROLLED IN A CHEMICAL 
DEPENDENCY/SUBSTANCEABUSE PROGRAM, SOME INFORMATION MAY BE OMITTED. This 
clinical summary was aggregated from multiple sources. Caution should be 
exercised in using it in the provision of clinical care. This summary normalizes
information from multiple sources, and as a consequence, information in this 
document may materially change the coding, format and clinical context of 
patient data. In addition, data may be omitted in some cases. CLINICAL DECISIONS
SHOULD BE BASED ON THE PRIMARY CLINICAL RECORDS. [employer] provides 
no warranty or guarantee of the accuracy or completeness of information in this 
document.

## 2025-02-20 ENCOUNTER — HOSPITAL ENCOUNTER (OUTPATIENT)
Dept: HOSPITAL 100 - US | Age: 30
Discharge: HOME | End: 2025-02-20
Payer: COMMERCIAL

## 2025-02-20 DIAGNOSIS — Z78.9: ICD-10-CM

## 2025-02-20 DIAGNOSIS — N91.2: Primary | ICD-10-CM

## 2025-02-20 PROCEDURE — 76817 TRANSVAGINAL US OBSTETRIC: CPT

## 2025-02-20 NOTE — US_ITS
PROCEDURE:  
TRANSVAGINAL W/PREG US  
   
REASON FOR EXAM:  
LMP: Unknown  
Dating.  
   
COMPARISON:  
None.  
   
FINDINGS:  
   
Number of Gestational Sacs: 1  
Gestational Sac Shape: Normal  
   
Number of Fetuses: 1  
Fetal Heart Rate: Not detected at this time.  
Survey of Visible Anatomic Structures: Grossly unremarkable for gestational age.  
   
Yolk Sac: Present and unremarkable.  
Placenta: Presently not well-visualized  
Amniotic Fluid Volume: Subjectively normal for gestational age.  
   
Uterine Abnormalities: Maternal uterus is unremarkable.  
Ovaries / Adnexa: Both maternal ovaries are visualized and unremarkable.  
   
   
FETAL DIMENSIONS:  
Parameter                   Measurement  / EGA  
Crown Rump Length:   2 mm/6 weeks 0 days  
Gestational Sac:          1.3 cm/6 weeks 0 days  
Yolk Sac:                   4 mm/  
   
ESTIMATED GESTATIONAL AGE:  
By Ultrasound: 6 weeks 0 days  
By LMP: Unknown  
   
ESTIMATED DATE OF DELIVERY:  
By Ultrasound: October 16, 2025  
   
ORDER #: [id number] US/Transvaginal w/Preg US  
IMPRESSION:  
Intrauterine gestation with a mean gestational age of 6 weeks and 0 days.  
No fetal cardiac activity noted at this time.  Follow-up recommended.  
   
Electronically Signed By: Michi Galan on 02/21/2025 10:59  
Reading Location: [facility]

## 2025-03-07 ENCOUNTER — HOSPITAL ENCOUNTER (OUTPATIENT)
Age: 30
Discharge: HOME | End: 2025-03-07
Payer: COMMERCIAL

## 2025-03-07 DIAGNOSIS — O99.210: ICD-10-CM

## 2025-03-07 DIAGNOSIS — O26.899: Primary | ICD-10-CM

## 2025-03-07 DIAGNOSIS — Z3A.00: ICD-10-CM

## 2025-03-07 LAB
DEPRECATED RDW RBC: 40.7 FL (ref 35.1–43.9)
ERYTHROCYTE [DISTWIDTH] IN BLOOD: 13.4 % (ref 11.6–14.6)
HCT VFR BLD AUTO: 38.1 % (ref 37–47)
HEMOGLOBIN: 12.5 G/DL (ref 12–15)
HGB BLD-MCNC: 12.5 G/DL (ref 12–15)
IMMATURE GRANULOCYTES COUNT: 0.03 X10^3/UL (ref 0–0)
MCV RBC: 83.2 FL (ref 81–99)
MEAN CORP HGB CONC: 32.8 G/DL (ref 32–36)
MEAN PLATELET VOL.: 10.6 FL (ref 6.2–12)
NRBC FLAGGED BY ANALYZER: 0 % (ref 0–5)
PLATELET # BLD: 359 K/MM3 (ref 150–450)
PLATELET COUNT: 359 K/MM3 (ref 150–450)
RBC # BLD AUTO: 4.58 M/MM3 (ref 4.2–5.4)
RBC DISTRIBUTION WIDTH CV: 13.4 % (ref 11.6–14.6)
RBC DISTRIBUTION WIDTH SD: 40.7 FL (ref 35.1–43.9)
WBC # BLD AUTO: 9 K/MM3 (ref 4.4–11)
WHITE BLOOD COUNT: 9 K/MM3 (ref 4.4–11)

## 2025-03-07 PROCEDURE — 36415 COLL VENOUS BLD VENIPUNCTURE: CPT

## 2025-03-07 PROCEDURE — 87491 CHLMYD TRACH DNA AMP PROBE: CPT

## 2025-03-07 PROCEDURE — 86703 HIV-1/HIV-2 1 RESULT ANTBDY: CPT

## 2025-03-07 PROCEDURE — 83036 HEMOGLOBIN GLYCOSYLATED A1C: CPT

## 2025-03-07 PROCEDURE — 86780 TREPONEMA PALLIDUM: CPT

## 2025-03-07 PROCEDURE — 87591 N.GONORRHOEAE DNA AMP PROB: CPT

## 2025-03-07 PROCEDURE — 86850 RBC ANTIBODY SCREEN: CPT

## 2025-03-07 PROCEDURE — 87086 URINE CULTURE/COLONY COUNT: CPT

## 2025-03-07 PROCEDURE — 86900 BLOOD TYPING SEROLOGIC ABO: CPT

## 2025-03-07 PROCEDURE — 85025 COMPLETE CBC W/AUTO DIFF WBC: CPT

## 2025-03-07 PROCEDURE — 86762 RUBELLA ANTIBODY: CPT

## 2025-03-07 PROCEDURE — 86901 BLOOD TYPING SEROLOGIC RH(D): CPT

## 2025-05-30 ENCOUNTER — HOSPITAL ENCOUNTER (OUTPATIENT)
Dept: HOSPITAL 100 - US | Age: 30
Discharge: HOME | End: 2025-05-30
Payer: COMMERCIAL

## 2025-05-30 DIAGNOSIS — O09.91: Primary | ICD-10-CM

## 2025-05-30 DIAGNOSIS — Z3A.00: ICD-10-CM

## 2025-05-30 PROCEDURE — 76805 OB US >/= 14 WKS SNGL FETUS: CPT

## 2025-05-30 PROCEDURE — 76817 TRANSVAGINAL US OBSTETRIC: CPT

## 2025-06-12 ENCOUNTER — HOSPITAL ENCOUNTER (OUTPATIENT)
Dept: HOSPITAL 100 - US | Age: 30
Discharge: HOME | End: 2025-06-12
Payer: COMMERCIAL

## 2025-06-12 DIAGNOSIS — Z3A.00: ICD-10-CM

## 2025-06-12 DIAGNOSIS — O09.90: Primary | ICD-10-CM

## 2025-06-12 PROCEDURE — 76815 OB US LIMITED FETUS(S): CPT

## 2025-06-26 ENCOUNTER — APPOINTMENT (OUTPATIENT)
Dept: URBAN - METROPOLITAN AREA CLINIC 204 | Age: 30
Setting detail: DERMATOLOGY
End: 2025-06-26

## 2025-06-26 DIAGNOSIS — L91.8 OTHER HYPERTROPHIC DISORDERS OF THE SKIN: ICD-10-CM

## 2025-06-26 DIAGNOSIS — L57.8 OTHER SKIN CHANGES DUE TO CHRONIC EXPOSURE TO NONIONIZING RADIATION: ICD-10-CM

## 2025-06-26 DIAGNOSIS — D22 MELANOCYTIC NEVI: ICD-10-CM

## 2025-06-26 DIAGNOSIS — L82.1 OTHER SEBORRHEIC KERATOSIS: ICD-10-CM

## 2025-06-26 PROBLEM — D22.5 MELANOCYTIC NEVI OF TRUNK: Status: ACTIVE | Noted: 2025-06-26

## 2025-06-26 PROCEDURE — OTHER MIPS QUALITY: OTHER

## 2025-06-26 PROCEDURE — OTHER OBSERVATION: OTHER

## 2025-06-26 PROCEDURE — OTHER COUNSELING: OTHER

## 2025-06-26 PROCEDURE — 99213 OFFICE O/P EST LOW 20 MIN: CPT

## 2025-06-26 ASSESSMENT — LOCATION SIMPLE DESCRIPTION DERM
LOCATION SIMPLE: RIGHT ANTERIOR NECK
LOCATION SIMPLE: LEFT UPPER BACK
LOCATION SIMPLE: RIGHT BUTTOCK
LOCATION SIMPLE: RIGHT UPPER BACK

## 2025-06-26 ASSESSMENT — LOCATION DETAILED DESCRIPTION DERM
LOCATION DETAILED: LEFT SUPERIOR MEDIAL UPPER BACK
LOCATION DETAILED: RIGHT MID-UPPER BACK
LOCATION DETAILED: RIGHT INFERIOR LATERAL NECK
LOCATION DETAILED: RIGHT BUTTOCK
LOCATION DETAILED: LEFT SUPERIOR UPPER BACK

## 2025-06-26 ASSESSMENT — LOCATION ZONE DERM
LOCATION ZONE: TRUNK
LOCATION ZONE: NECK

## 2025-07-24 ENCOUNTER — HOSPITAL ENCOUNTER (OUTPATIENT)
Age: 30
Discharge: HOME | End: 2025-07-24
Payer: COMMERCIAL

## 2025-07-24 DIAGNOSIS — Z13.1: ICD-10-CM

## 2025-07-24 DIAGNOSIS — O09.91: Primary | ICD-10-CM

## 2025-07-24 DIAGNOSIS — Z3A.00: ICD-10-CM

## 2025-07-24 LAB
ABSOLUTE NEUTROPHIL COUNT: 6.8 X10^3/UL (ref 2–7.7)
ANISOCYTOSIS BLD QL SMEAR: (no result)
BASO STIPL BLD QL SMEAR: (no result)
BASOPHIL#: 0.03 X10^3/UL
BASOPHIL%: 0.3 % (ref 0–1)
BASOPHILS NFR SPEC MANUAL: (no result) % (ref 0–1)
BITE CELLS BLD QL SMEAR: (no result)
BURR CELLS BLD QL SMEAR: (no result)
DEPRECATED RDW RBC: 42.4 FL (ref 35.1–43.9)
DOHLE BOD BLD QL SMEAR: (no result)
EOSINOPHIL # BLD: 0.09 X10^3/UL
ERYTHROCYTE [DISTWIDTH] IN BLOOD: 13.7 % (ref 11.6–14.6)
GLUCOSE 1H P 50 G GLC PO SERPL-MCNC: 109 MG/DL (ref 70–140)
HCT VFR BLD AUTO: 34.9 % (ref 37–47)
HGB BLD-MCNC: 11.3 G/DL (ref 12–15)
HIV: NONREACTIVE
HOWELL-JOLLY BOD BLD QL SMEAR: (no result)
HYPOCHROMIA BLD QL: (no result)
IMM GRANULOCYTES NFR BLD AUTO: 0.7 % (ref 0–0.9)
IMMATURE GRANULOCYTES COUNT: 0.07 X10^3/UL (ref 0–0)
LYMPHOCYTES # SPEC AUTO: 1.92 X10^3/UL (ref 0.83–4.51)
LYMPHOCYTES # SPEC AUTO: 1.92 X10^3/UL (ref 0.83–4.51)
LYMPHOCYTES NFR SPEC AUTO: 20.2 % (ref 19–41)
Lab: 0.9 % (ref 0–5)
Lab: NONREACTIVE
MACROCYTES BLD QL: (no result)
MANUAL DIF COMMENT BLD-IMP: (no result)
MCH RBC QN AUTO: 27.4 PG (ref 27–32)
MCV RBC: 84.7 FL (ref 81–99)
MEAN CORP HGB CONC: 32.4 G/DL (ref 32–36)
MEAN PLATELET VOL.: 10.8 FL (ref 6.2–12)
MONOCYTE#: 0.58 X10^3/UL
MONOCYTE%: 6.1 % (ref 0–10)
NEUTROPHIL #: 6.83 X10^3/UL (ref 2.7–7.7)
NEUTROPHILS NFR BLD AUTO: 71.8 % (ref 47–70)
NEUTS HYPERSEG # BLD: (no result) 10*3/UL
NRBC FLAGGED BY ANALYZER: 0 % (ref 0–5)
PLATELET # BLD: 262 K/MM3 (ref 150–450)
POLYCHROMASIA BLD QL SMEAR: (no result)
RBC # BLD AUTO: 4.12 M/MM3 (ref 4.2–5.4)
WBC # BLD AUTO: 9.5 K/MM3 (ref 4.4–11)
WBC NRBC COR # BLD: (no result) K/MM3 (ref 4.4–11)

## 2025-07-24 PROCEDURE — 82950 GLUCOSE TEST: CPT

## 2025-07-24 PROCEDURE — 85025 COMPLETE CBC W/AUTO DIFF WBC: CPT

## 2025-07-24 PROCEDURE — 36415 COLL VENOUS BLD VENIPUNCTURE: CPT

## 2025-07-24 PROCEDURE — 86780 TREPONEMA PALLIDUM: CPT

## 2025-07-24 PROCEDURE — 86703 HIV-1/HIV-2 1 RESULT ANTBDY: CPT

## 2025-07-25 ENCOUNTER — HOSPITAL ENCOUNTER (OUTPATIENT)
Dept: HOSPITAL 100 - LAB | Age: 30
Discharge: HOME | End: 2025-07-25
Payer: COMMERCIAL

## 2025-07-25 DIAGNOSIS — Z3A.00: ICD-10-CM

## 2025-07-25 DIAGNOSIS — O09.91: Primary | ICD-10-CM

## 2025-07-25 LAB
HEPATITIS B SURFACE ANTIGEN: NONREACTIVE
HEPATITIS C ANTIBODY: NONREACTIVE

## 2025-07-25 PROCEDURE — 86900 BLOOD TYPING SEROLOGIC ABO: CPT

## 2025-07-25 PROCEDURE — 86803 HEPATITIS C AB TEST: CPT

## 2025-07-25 PROCEDURE — 87340 HEPATITIS B SURFACE AG IA: CPT

## 2025-07-25 PROCEDURE — 86901 BLOOD TYPING SEROLOGIC RH(D): CPT

## 2025-07-25 PROCEDURE — 86850 RBC ANTIBODY SCREEN: CPT
